# Patient Record
Sex: FEMALE | Race: OTHER | NOT HISPANIC OR LATINO | URBAN - METROPOLITAN AREA
[De-identification: names, ages, dates, MRNs, and addresses within clinical notes are randomized per-mention and may not be internally consistent; named-entity substitution may affect disease eponyms.]

---

## 2023-04-10 ENCOUNTER — APPOINTMENT (RX ONLY)
Dept: URBAN - METROPOLITAN AREA CLINIC 141 | Facility: CLINIC | Age: 68
Setting detail: DERMATOLOGY
End: 2023-04-10

## 2023-04-10 DIAGNOSIS — L57.8 OTHER SKIN CHANGES DUE TO CHRONIC EXPOSURE TO NONIONIZING RADIATION: ICD-10-CM

## 2023-04-10 DIAGNOSIS — D18.0 HEMANGIOMA: ICD-10-CM

## 2023-04-10 DIAGNOSIS — D49.2 NEOPLASM OF UNSPECIFIED BEHAVIOR OF BONE, SOFT TISSUE, AND SKIN: ICD-10-CM

## 2023-04-10 DIAGNOSIS — L82.1 OTHER SEBORRHEIC KERATOSIS: ICD-10-CM

## 2023-04-10 DIAGNOSIS — L81.4 OTHER MELANIN HYPERPIGMENTATION: ICD-10-CM | Status: STABLE

## 2023-04-10 DIAGNOSIS — L82.0 INFLAMED SEBORRHEIC KERATOSIS: ICD-10-CM

## 2023-04-10 DIAGNOSIS — L20.89 OTHER ATOPIC DERMATITIS: ICD-10-CM

## 2023-04-10 DIAGNOSIS — L81.4 OTHER MELANIN HYPERPIGMENTATION: ICD-10-CM

## 2023-04-10 PROBLEM — L20.84 INTRINSIC (ALLERGIC) ECZEMA: Status: ACTIVE | Noted: 2023-04-10

## 2023-04-10 PROBLEM — D18.01 HEMANGIOMA OF SKIN AND SUBCUTANEOUS TISSUE: Status: ACTIVE | Noted: 2023-04-10

## 2023-04-10 PROCEDURE — 17110 DESTRUCTION B9 LES UP TO 14: CPT

## 2023-04-10 PROCEDURE — 11102 TANGNTL BX SKIN SINGLE LES: CPT | Mod: 59

## 2023-04-10 PROCEDURE — ? ELECTRODESICCATION

## 2023-04-10 PROCEDURE — ? BIOPSY BY SHAVE METHOD

## 2023-04-10 PROCEDURE — ? COUNSELING

## 2023-04-10 PROCEDURE — ? PRESCRIPTION

## 2023-04-10 PROCEDURE — 99203 OFFICE O/P NEW LOW 30 MIN: CPT | Mod: 25

## 2023-04-10 PROCEDURE — ? LIQUID NITROGEN

## 2023-04-10 RX ORDER — BETAMETHASONE DIPROPIONATE 0.5 MG/ML
LOTION TOPICAL
Qty: 60 | Refills: 0 | Status: ERX | COMMUNITY
Start: 2023-04-10

## 2023-04-10 RX ORDER — HYDROQUINONE 40 MG/G
CREAM TOPICAL
Qty: 28.4 | Refills: 3 | Status: ERX | COMMUNITY
Start: 2023-04-10

## 2023-04-10 RX ADMIN — BETAMETHASONE DIPROPIONATE: 0.5 LOTION TOPICAL at 00:00

## 2023-04-10 RX ADMIN — HYDROQUINONE: 40 CREAM TOPICAL at 00:00

## 2023-04-10 ASSESSMENT — LOCATION SIMPLE DESCRIPTION DERM
LOCATION SIMPLE: RIGHT FOREHEAD
LOCATION SIMPLE: HAIR
LOCATION SIMPLE: POSTERIOR NECK
LOCATION SIMPLE: LEFT CHEEK
LOCATION SIMPLE: RIGHT FOREHEAD
LOCATION SIMPLE: LEFT FOREHEAD
LOCATION SIMPLE: ABDOMEN
LOCATION SIMPLE: LEFT UPPER BACK
LOCATION SIMPLE: RIGHT CHEEK
LOCATION SIMPLE: RIGHT ANTERIOR NECK
LOCATION SIMPLE: RIGHT UPPER BACK
LOCATION SIMPLE: ABDOMEN
LOCATION SIMPLE: LEFT UPPER BACK

## 2023-04-10 ASSESSMENT — LOCATION ZONE DERM
LOCATION ZONE: SCALP
LOCATION ZONE: TRUNK
LOCATION ZONE: TRUNK
LOCATION ZONE: NECK
LOCATION ZONE: FACE
LOCATION ZONE: FACE

## 2023-04-10 ASSESSMENT — LOCATION DETAILED DESCRIPTION DERM
LOCATION DETAILED: EPIGASTRIC SKIN
LOCATION DETAILED: LEFT RIB CAGE
LOCATION DETAILED: RIGHT INFERIOR LATERAL FOREHEAD
LOCATION DETAILED: RIGHT SUPERIOR LATERAL NECK
LOCATION DETAILED: RIGHT INFERIOR CENTRAL MALAR CHEEK
LOCATION DETAILED: RIGHT SUPERIOR UPPER BACK
LOCATION DETAILED: PERIUMBILICAL SKIN
LOCATION DETAILED: LEFT LATERAL ABDOMEN
LOCATION DETAILED: LEFT LATERAL FOREHEAD
LOCATION DETAILED: RIGHT LATERAL ABDOMEN
LOCATION DETAILED: UMBILICUS
LOCATION DETAILED: LEFT CENTRAL MALAR CHEEK
LOCATION DETAILED: RIGHT LATERAL FOREHEAD
LOCATION DETAILED: HAIR
LOCATION DETAILED: LEFT INFERIOR MEDIAL UPPER BACK
LOCATION DETAILED: RIGHT RIB CAGE
LOCATION DETAILED: LEFT SUPERIOR UPPER BACK
LOCATION DETAILED: RIGHT POSTERIOR NECK

## 2023-04-10 NOTE — PROCEDURE: BIOPSY BY SHAVE METHOD
Detail Level: Detailed
Depth Of Biopsy: dermis
Was A Bandage Applied: Yes
Size Of Lesion In Cm: 0.1
X Size Of Lesion In Cm: 0
Biopsy Type: H and E
Biopsy Method: Dermablade
Anesthesia Type: 1% lidocaine with epinephrine
Anesthesia Volume In Cc (Will Not Render If 0): 0.5
Hemostasis: Drysol
Wound Care: Petrolatum
Dressing: bandage
Destruction After The Procedure: No
Type Of Destruction Used: Curettage
Curettage Text: The wound bed was treated with curettage after the biopsy was performed.
Cryotherapy Text: The wound bed was treated with cryotherapy after the biopsy was performed.
Electrodesiccation Text: The wound bed was treated with electrodesiccation after the biopsy was performed.
Electrodesiccation And Curettage Text: The wound bed was treated with electrodesiccation and curettage after the biopsy was performed.
Silver Nitrate Text: The wound bed was treated with silver nitrate after the biopsy was performed.
Lab: -[id number]
Consent: Written consent was obtained and risks were reviewed including but not limited to scarring, infection, bleeding, scabbing, incomplete removal, nerve damage and allergy to anesthesia.
Post-Care Instructions: I reviewed with the patient in detail post-care instructions. Patient is to keep the biopsy site dry overnight, and then apply bacitracin twice daily until healed. Patient may apply hydrogen peroxide soaks to remove any crusting.
Notification Instructions: Patient will be notified of biopsy results. However, patient instructed to call the office if not contacted within 2 weeks.
Billing Type: [employer]
Information: Selecting Yes will display possible errors in your note based on the variables you have selected. This validation is only offered as a suggestion for you. PLEASE NOTE THAT THE VALIDATION TEXT WILL BE REMOVED WHEN YOU FINALIZE YOUR NOTE. IF YOU WANT TO FAX A PRELIMINARY NOTE YOU WILL NEED TO TOGGLE THIS TO 'NO' IF YOU DO NOT WANT IT IN YOUR FAXED NOTE.

## 2023-04-10 NOTE — PROCEDURE: ELECTRODESICCATION
Medical Necessity Clause: This procedure was medically necessary because the lesions that were treated were:
Consent: The patient's consent was obtained including but not limited to risks of crusting, scabbing, blistering, scarring, darker or lighter pigmentary change, recurrence, incomplete removal and infection.
Include Z78.9 (Other Specified Conditions Influencing Health Status) As An Associated Diagnosis?: No
Post-Care Instructions: I reviewed with the patient in detail post-care instructions. Patient is to wear sunprotection, and avoid picking at any of the treated lesions.  Pt may apply Vaseline to crusted or scabbing areas
Medical Necessity Information: It is in your best interest to select a reason for this procedure from the list below. All of these items fulfill various CMS LCD requirements except the new and changing color options.
Detail Level: Simple
Anesthesia Type: 1% lidocaine with epinephrine

## 2023-04-10 NOTE — PROCEDURE: LIQUID NITROGEN
Consent: The patient's consent was obtained including but not limited to risks of crusting, scabbing, blistering, scarring, darker or lighter pigmentary change, recurrence, incomplete removal and infection.
Render Note In Bullet Format When Appropriate: No
Include Z78.9 (Other Specified Conditions Influencing Health Status) As An Associated Diagnosis?: Yes
Detail Level: Detailed
Medical Necessity Clause: This procedure was medically necessary because the lesions that were treated were:
Spray Paint Text: The liquid nitrogen was applied to the skin utilizing a spray paint frosting technique.
Post-Care Instructions: I reviewed with the patient in detail post-care instructions. Patient is to wear sunprotection, and avoid picking at any of the treated lesions. Pt may apply Vaseline to crusted or scabbing areas.
Medical Necessity Information: It is in your best interest to select a reason for this procedure from the list below. All of these items fulfill various CMS LCD requirements except the new and changing color options.

## 2023-05-08 ENCOUNTER — APPOINTMENT (RX ONLY)
Dept: URBAN - METROPOLITAN AREA CLINIC 141 | Facility: CLINIC | Age: 68
Setting detail: DERMATOLOGY
End: 2023-05-08

## 2023-05-08 DIAGNOSIS — I87.2 VENOUS INSUFFICIENCY (CHRONIC) (PERIPHERAL): ICD-10-CM | Status: INADEQUATELY CONTROLLED

## 2023-05-08 DIAGNOSIS — I87.2 VENOUS INSUFFICIENCY (CHRONIC) (PERIPHERAL): ICD-10-CM

## 2023-05-08 PROCEDURE — ? VEIN TREATMENT PLAN

## 2023-05-08 PROCEDURE — ? MEDICAL CONSULTATION: VENOUS DISEASE

## 2023-05-08 PROCEDURE — ? LOWER EXTREMITY DOPPLER US

## 2023-05-08 PROCEDURE — 93970 EXTREMITY STUDY: CPT

## 2023-05-08 PROCEDURE — 99204 OFFICE O/P NEW MOD 45 MIN: CPT

## 2023-05-08 NOTE — PROCEDURE: MEDICAL CONSULTATION: VENOUS DISEASE
Left Leg Ulcer Diameter: 0- None
Left Leg Circumference: medium
Right Leg Pain: 2- Daily, moderate activity limitation, occasional analgesics
Left Leg Compression Therapy: 1- Intermittent use of stockings
Right Dorsalis Pedis Pulse: 2 (Easily palpable)
Left Leg: Peripheral Vascular Disease?: No
Include Vcss In The Note?: Yes
Length Of Time Symptoms Present (Include Units): 10 years
Left Leg Venous Hyperpigmentation: 2- Diffuse over most of gaiter distribution (lower 1/3) or recent pigmentation (purple)
Detail Level: Detailed
Left Leg Varicose Veins: 2- Multiple: GS varicose veins confined to calf or thigh

## 2023-05-08 NOTE — PROCEDURE: VEIN TREATMENT PLAN
Closing Statement (Will Not Render If Left Blank): Discussed treatment options. Risks and benefits of each procedure discussed. These are including but not limited to: deep vein thrombosis, pulmonary embolism, paresthesia, phlebitis, infection, bleeding, and visible scarring. After risks and benefits have been reviewed with the patient and all questions answered the patient has decided to move forward with treatment. Patient education booklet given and pre/post procedure instructions reviewed with the patient.
Angie Sessions - Right: 1
Ugs Sessions - Right: 2
Detail Level: Detailed
Intro Statement (Will Not Render If Left Blank): The patient has signs and symptoms of chronic venous hypertension affecting daily function with US of the lower extremities demonstrating venous insufficiency. The patient has failed conservative treatment including avoiding prolonged standing, leg elevation, analgesics, exercise, weight management and graduated compression stockings (20-30mmHg or higher).
Symptom Statement (Will Not Render If Left Blank): US demonstrates severe reflux in the:\\n\\nBilateral GSVs and bilateral tributaries. \\n\\n\\nThe plan is to proceed with:\\n\\nVenaseal of the GSV \\nVarithena of the D GSV   \\nbilateral UGS. \\n\\nRisks, benefits, and alternatives were discussed.

## 2023-05-08 NOTE — PROCEDURE: LOWER EXTREMITY DOPPLER US
Size In Mm: 3.1
Size In Mm: 4.9
Size In Mm: 4.3
Reflux Options: Use Free Text
Recommend Cosmetic Sclerotherapy: No
Reflux (In Seconds - Leave Blank If Not Applicable): 2.0
Right Tributary Reflux (In Seconds - Leave Blank If Not Applicable): 0.7
Reflux (In Seconds - Leave Blank If Not Applicable): 1.8
Left Lower Extremity Comments: TRIBS MEDIAL KNEE.
See Attached Documentation Text: Please refer to the attached ultrasound documentation for complete details of the procedure and the venous findings.
Reflux (In Seconds - Leave Blank If Not Applicable): 2.6
Reflux (In Seconds - Leave Blank If Not Applicable): 0.8
Reflux (In Seconds - Leave Blank If Not Applicable): 1.9
Continue Post-Procedural Therapy: Yes
Reflux (In Seconds - Leave Blank If Not Applicable): 1.7
Right Tributary Size In Mm: 4.0
Size In Mm: 5.1
Right Lower Extremity Comments: TRIBS MEDIAL  AND ANTERIOR/LATERAL CALF.
Detail Level: Simple
Reflux (In Seconds - Leave Blank If Not Applicable): 1.3
Treatment Number (Optional): Venous: BL GSV, BL D GSV
Left Tributary Reflux (In Seconds - Leave Blank If Not Applicable): 2.2
Size In Mm: 4.8
Size In Mm: 3.5
Size In Mm: 4.6
Size In Mm: 2.7

## 2023-06-12 ENCOUNTER — APPOINTMENT (RX ONLY)
Dept: URBAN - METROPOLITAN AREA CLINIC 141 | Facility: CLINIC | Age: 68
Setting detail: DERMATOLOGY
End: 2023-06-12

## 2023-06-12 DIAGNOSIS — I87.2 VENOUS INSUFFICIENCY (CHRONIC) (PERIPHERAL): ICD-10-CM

## 2023-06-12 PROCEDURE — ? ENDOVENOUS ABLATION WITH VENASEAL

## 2023-06-12 PROCEDURE — 36482 ENDOVEN THER CHEM ADHES 1ST: CPT | Mod: LT

## 2023-06-12 NOTE — PROCEDURE: ENDOVENOUS ABLATION WITH VENASEAL
Preamble Single Vein: Patient seen today for ultrasound-guided cyanoacrylate embolization of the left GSV by administration of medical adhesive VenaSeal. The procedure was explained in depth to the patient and informed consent was signed. Alternative treatment options discussed. Questions answered. The patient voiced understanding of the procedure and potential complications including but not limited to allergic recaption, infection, bleeding, DVT, pulmonary embolism, skin burns/discoloration, nerve injury, arterial injury, ulcer and paresthesia. \\n\\nThe patient was placed on the table in supine position. Using sterile technique and  1 cc of 1% lidocaine was injected in the skin and subcutaneous tissues overlying the venous access site. The left GSV was accessed with micro puncture set under ultrasound guidance at the level of the mid thigh. A micro puncture sheath was exchanged over the wire for a long vascular sheath with its tip positioned at the SFJ under ultrasound guidance. A catheter was then advanced through the sheath with its tip positioned in the GSV, 5 cm from the SFJ, which was achieved under direct ultrasound guidance. Cyanoacrylate embolization was performed. \\n\\nTotal of 43 cm of the vein were treated with 1.5 mL cyanoacrylate administered. \\n\\nThe sheath and catheter were then removed and hemostasis was achieved. Ultrasound examination immediately after the procedure demonstrated no evidence of DVT in the left lower extremity. \\n\\nSterile dressings were applied. The patient tolerated the procedure well without immediate complications and left the operating room ambulating in stable condition. \\n\\nPost-operative instructions given and reviewed with patient verbally and in writing. The patient was given written instructions and Dr Celia Juarez cell phone number to contact with any issues or concerns. Patient verbalized understanding of all instructions. Questions encouraged and answered.
Access Site Anesthesia Type: 1% lidocaine with epinephrine
Evla Access: medial calf
Vein #1: Left Anterior Accessory Great Saphenous Vein
Number Of Kits Used (Required For Inventory): 1
Medical Necessity Information: LCD Guidelines vary from payer to payer. Please check with your payer's policy to determine medical necessity.
Evla Access: medial knee
Show Inventory Tab: Hide
Amount Of Adhesive Delivered: 1.5
Add A New Paragraph For Post-Care?: No
Consent was obtained with risks, benefits, and alternatives discussed for the VenaSeal procedure. Photographs were taken.
Detail Level: Detailed

## 2023-06-19 ENCOUNTER — APPOINTMENT (RX ONLY)
Dept: URBAN - METROPOLITAN AREA CLINIC 141 | Facility: CLINIC | Age: 68
Setting detail: DERMATOLOGY
End: 2023-06-19

## 2023-06-19 DIAGNOSIS — I87.2 VENOUS INSUFFICIENCY (CHRONIC) (PERIPHERAL): ICD-10-CM

## 2023-06-19 PROCEDURE — ? ENDOVENOUS ABLATION WITH VENASEAL

## 2023-06-19 PROCEDURE — 36482 ENDOVEN THER CHEM ADHES 1ST: CPT | Mod: RT

## 2023-06-19 NOTE — PROCEDURE: ENDOVENOUS ABLATION WITH VENASEAL
Access Site Anesthesia Type: 1% lidocaine with epinephrine
Evla Access: medial calf
Detail Level: Detailed
Amount Of Adhesive Delivered: 0.6
Show Inventory Tab: Hide
Amount Of Adhesive Delivered: 1.4
Vein #1: Right Great Saphenous Vein
Evla Access: medial thigh
Medical Necessity Clause: Lot # [id number]
Medical Necessity Information: LCD Guidelines vary from payer to payer. Please check with your payer's policy to determine medical necessity.
Evla Access: posterior mid calf
Number Of Kits Used (Required For Inventory): 1
Consent was obtained with risks, benefits, and alternatives discussed for the VenaSeal procedure. Photographs were taken.
Add A New Paragraph For Post-Care?: No
Preamble Single Vein: Patient seen today for ultrasound-guided cyanoacrylate embolization of the right GSV by administration of medical adhesive VenaSeal. The procedure was explained in depth to the patient and informed consent was signed. Alternative treatment options discussed. Questions answered. The patient voiced understanding of the procedure and potential complications including but not limited to allergic recaption, infection, bleeding, DVT, pulmonary embolism, skin burns/discoloration, nerve injury, arterial injury, ulcer and paresthesia. \\n\\nThe patient was placed on the table in prone position. Using sterile technique and  .3 cc of 1% lidocaine was injected in the skin and subcutaneous tissues overlying the venous access site. The right GSV was accessed with micro puncture set under ultrasound guidance at the level of the medial calf. A micro puncture sheath was exchanged over the wire for a long vascular sheath with its tip positioned at the SFJ under ultrasound guidance. A catheter was then advanced through the sheath with its tip positioned in the GSV 5 cm from the SFJ, which was achieved under direct ultrasound guidance. Cyanoacrylate embolization was performed. \\n\\nTotal of 40 cm of the vein were treated with -1.4 mL cyanoacrylate administered. \\n\\nThe sheath and catheter were then removed and hemostasis was achieved. Ultrasound examination immediately after the procedure demonstrated no evidence of DVT in the left lower extremity. \\n\\nSterile dressings were applied. The patient tolerated the procedure well without immediate complications and left the operating room ambulating in stable condition. \\n\\nPost-operative instructions given and reviewed with patient verbally and in writing. The patient was given written instructions and Dr Shelton Cluster cell phone number to contact with any issues or concerns. Patient verbalized understanding of all instructions. Questions encouraged and answered.

## 2023-06-26 ENCOUNTER — APPOINTMENT (RX ONLY)
Dept: URBAN - METROPOLITAN AREA CLINIC 141 | Facility: CLINIC | Age: 68
Setting detail: DERMATOLOGY
End: 2023-06-26

## 2023-06-26 DIAGNOSIS — I87.2 VENOUS INSUFFICIENCY (CHRONIC) (PERIPHERAL): ICD-10-CM

## 2023-06-26 PROCEDURE — ? VARITHENA SCLEROTHERAPY

## 2023-06-26 PROCEDURE — 36465 NJX NONCMPND SCLRSNT 1 VEIN: CPT | Mod: LT

## 2023-06-26 ASSESSMENT — LOCATION SIMPLE DESCRIPTION DERM: LOCATION SIMPLE: LEFT PRETIBIAL REGION

## 2023-06-26 ASSESSMENT — LOCATION DETAILED DESCRIPTION DERM: LOCATION DETAILED: LEFT DISTAL PRETIBIAL REGION

## 2023-06-26 ASSESSMENT — LOCATION ZONE DERM: LOCATION ZONE: LEG

## 2023-06-26 NOTE — PROCEDURE: VARITHENA SCLEROTHERAPY
Detail Level: Detailed
Sclerosant Volume (Cc): 3
Consent was obtained with risks, benefits, and alternatives discussed for the above procedures.
Expiration Date A (Month Year): [id number]
Disposition: Compression stockings and short stretch elastic bandages were placed postoperatively.
Volume Of Varithena Foam Removed From Canister (Cc): 0
Sclerosant (A) %: 1
Sclerosant (A): Varithena Foam
Render Post Care In Note: No
Show Inventory Tab: Hide
Lot # A: [id number]

## 2023-07-10 ENCOUNTER — APPOINTMENT (RX ONLY)
Dept: URBAN - METROPOLITAN AREA CLINIC 141 | Facility: CLINIC | Age: 68
Setting detail: DERMATOLOGY
End: 2023-07-10

## 2023-07-10 DIAGNOSIS — I87.2 VENOUS INSUFFICIENCY (CHRONIC) (PERIPHERAL): ICD-10-CM

## 2023-07-10 PROCEDURE — ? VARITHENA SCLEROTHERAPY

## 2023-07-10 PROCEDURE — 36465 NJX NONCMPND SCLRSNT 1 VEIN: CPT | Mod: RT

## 2023-07-10 ASSESSMENT — LOCATION SIMPLE DESCRIPTION DERM: LOCATION SIMPLE: RIGHT PRETIBIAL REGION

## 2023-07-10 ASSESSMENT — LOCATION DETAILED DESCRIPTION DERM: LOCATION DETAILED: RIGHT DISTAL PRETIBIAL REGION

## 2023-07-10 ASSESSMENT — LOCATION ZONE DERM: LOCATION ZONE: LEG

## 2023-07-10 NOTE — PROCEDURE: VARITHENA SCLEROTHERAPY
Sclerosant Volume (Cc): 3
Consent was obtained with risks, benefits, and alternatives discussed for the above procedures.
Number Of Injections (Will Not Render If 0): 1
Sclerosant (A): Varithena Foam
Show Inventory Tab: Hide
Lot # A: [id number]
Volume Of Varithena Foam Removed From Canister (Cc): 0
Detail Level: Detailed
Render Post Care In Note: No
Disposition: Compression stockings and short stretch elastic bandages were placed postoperatively.
Expiration Date A (Month Year): MARCH 2024

## 2023-08-28 ENCOUNTER — APPOINTMENT (RX ONLY)
Dept: URBAN - METROPOLITAN AREA CLINIC 141 | Facility: CLINIC | Age: 68
Setting detail: DERMATOLOGY
End: 2023-08-28

## 2023-08-28 DIAGNOSIS — I87.2 VENOUS INSUFFICIENCY (CHRONIC) (PERIPHERAL): ICD-10-CM

## 2023-08-28 PROCEDURE — 76942 ECHO GUIDE FOR BIOPSY: CPT

## 2023-08-28 PROCEDURE — 36471 NJX SCLRSNT MLT INCMPTNT VN: CPT | Mod: LT

## 2023-08-28 PROCEDURE — ? SCLEROTHERAPY

## 2023-08-28 ASSESSMENT — LOCATION SIMPLE DESCRIPTION DERM
LOCATION SIMPLE: LEFT CALF
LOCATION SIMPLE: LEFT PRETIBIAL REGION
LOCATION SIMPLE: LEFT THIGH

## 2023-08-28 ASSESSMENT — LOCATION ZONE DERM: LOCATION ZONE: LEG

## 2023-08-28 ASSESSMENT — LOCATION DETAILED DESCRIPTION DERM
LOCATION DETAILED: LEFT DISTAL PRETIBIAL REGION
LOCATION DETAILED: LEFT PROXIMAL PRETIBIAL REGION
LOCATION DETAILED: LEFT PROXIMAL LATERAL CALF
LOCATION DETAILED: LEFT DISTAL CALF
LOCATION DETAILED: LEFT ANTERIOR PROXIMAL THIGH
LOCATION DETAILED: LEFT MEDIAL PROXIMAL PRETIBIAL REGION
LOCATION DETAILED: LEFT ANTERIOR DISTAL THIGH

## 2023-08-28 NOTE — PROCEDURE: SCLEROTHERAPY
Render Post Care In Note: No
Sclerosant Volume (Cc): 1
Number Of Injections (Will Not Render If 0): 0
Post-Care Instructions: Compression for 3 weeks is critical to optimize your recovery and results. Compliance with compression helps to prevent blood clots and minimizes pain, swelling, bruising, skin discoloration (staining) and the recurrence of vessels. Materials to gather for your wound care (all available over the counter): Compression stockings x 2 pairs, 4 x 4 gauze, Comprilan wrap: 8 cm and 10 cm width wrap, Medical adhesive tape. Compression and Wound care;  Leg compression for 3 weeks is severino to your success and safety. Compression at night is only needed the first day. After that, compression is needed only during waking hours. However, if your leg feels better with compression at night, then you may continue compression at night as tolerated. After the sclerotherapy procedure, 2 layers compression will be placed. 1. On the skin, folded or flat gauze will cover the treated areas. 2. A compression wrap (Comprilan) will be wrapped around your leg over the gauze. Once the compression wrap is in place, a compression stocking will be worn. This two layer  compression (wrap plus stocking) should be worn for the first 24 hours if tolerated. 3. After 24 hours, remove all compressions and dressings and just wear the compression stockings only during waking hours. You will need to wear compression stockings for three weeks after your procedure, unless your physician instructs you otherwise. Activity: It is important NOT to be sitting or lying down for several hours after surgery. You may begin walking immediately after surgery. This is good for you, but take it easy. For 2 days after treatment: Avoid aerobic exercise, weight training, and all other types of exertion that increase your breathing and pulse rates. Do not get a tan for one month after sclerotherapy. Tanning increases your risk of skin discoloration. Bathing:       After 24 hours, you may shower or bathe in tepid water, but keep the compression stocking on. Avoid immersion in hot tubs. For pain or discomfort: You may take acetaminophen (TylenolTM, Extra-Strength TylenolTM or DatrilTM) as directed. Do not use aspirin, products containing aspirin, or ibuprofen (for example AdvilTM or MotrinTM) for five days after your surgery, unless approved by your physician. Dietary restrictions: Do not drink alcoholic beverages for two days after your sclerotherapy procedure. Possible side effects following sclerotherapy:  After sclerotherapy, mild swelling is expected. The injection sites may also become bruised or gray. You may also experience one or more of the following side effects, which almost always go away within one to four months:       Darkening of the injected veins. Brownish staining of the skin. Small clotted vessels under the surface of the skin that you can feel. Bruising of the injection sites:       What to do about bruising - This will resolve within 2-3 weeks. If you wish the bruising to disappear sooner, then applying Arnicare cream (over the counter, health food stores) will help. What to do if you feel small clotted vessels under the surface of the skin:       Call us for a follow up appointment. These small clots can be drained through a small nick. Draining these small clots will help you heal faster and with less discoloration. Contact your physician if you experience any of the following:       Treated areas become increasingly sore, tender, red or warm. Acetaminophen does not relieve your discomfort. Injection sites turn black or the skin around the site breaks down. Ulceration of the injection sites. You develop blisters from the tape. You develop significant swelling or pain in the leg. Darkening of large areas of the skin or foot.
Sclerosant Volume (Cc): 10
Sclerosant (A) %: 0.50
Detail Level: Detailed
Consent was obtained with risks, benefits, and alternatives discussed for the above procedures. Photographs were taken.
Number Of Injections (Will Not Render If 0): 9
Disposition: Compression stockings and short stretch elastic bandages were placed postoperatively.
Ultrasound Or Visual Sclerotherapy: Ultrasound

## 2023-10-16 ENCOUNTER — APPOINTMENT (RX ONLY)
Dept: URBAN - METROPOLITAN AREA CLINIC 141 | Facility: CLINIC | Age: 68
Setting detail: DERMATOLOGY
End: 2023-10-16

## 2023-10-16 DIAGNOSIS — I87.2 VENOUS INSUFFICIENCY (CHRONIC) (PERIPHERAL): ICD-10-CM

## 2023-10-16 PROCEDURE — 93970 EXTREMITY STUDY: CPT

## 2023-10-16 PROCEDURE — ? LOWER EXTREMITY DOPPLER US

## 2023-10-16 NOTE — PROCEDURE: LOWER EXTREMITY DOPPLER US
Size In Mm: 1.8
Treatment Number (Optional): Long Term F/U: R D GSV Reflux
Include Thrombophlebitis Instructions: Yes
Use - 'see Attached Documentation' Verbiage?: No
Size In Mm: 0
Size Options: Use Free Text
Right Intraluminal Thrombus- Yes: The right deep veins were imaged from the level of the common femoral vein to the posterior tibial veins. There was evidence of intraluminal thrombus as noted above.
See Attached Documentation Text: Please refer to the attached ultrasound documentation for complete details of the procedure and the venous findings.
Detail Level: Simple
Size In Mm: 2.5
Right Lower Extremity Comments: D GSV reflux to mid calf only. \\nTRIBS ANTERIOR/LATERAL CALF.
Size In Mm: 2.4
Right Tributary Size In Mm: 3.3
Size In Mm: 3.1
Right Tributary Reflux (In Seconds - Leave Blank If Not Applicable): 1.3
Size In Mm: 4.9
Size In Mm: 6.8
Reflux (In Seconds - Leave Blank If Not Applicable): 1.2

## 2023-10-23 ENCOUNTER — APPOINTMENT (RX ONLY)
Dept: URBAN - METROPOLITAN AREA CLINIC 141 | Facility: CLINIC | Age: 68
Setting detail: DERMATOLOGY
End: 2023-10-23

## 2023-10-23 DIAGNOSIS — I87.2 VENOUS INSUFFICIENCY (CHRONIC) (PERIPHERAL): ICD-10-CM

## 2023-10-23 PROCEDURE — 36471 NJX SCLRSNT MLT INCMPTNT VN: CPT | Mod: LT

## 2023-10-23 PROCEDURE — 76942 ECHO GUIDE FOR BIOPSY: CPT

## 2023-10-23 PROCEDURE — ? SCLEROTHERAPY

## 2023-10-23 PROCEDURE — ? VEIN TREATMENT PLAN

## 2023-10-23 ASSESSMENT — LOCATION SIMPLE DESCRIPTION DERM
LOCATION SIMPLE: LEFT KNEE
LOCATION SIMPLE: LEFT POPLITEAL SKIN
LOCATION SIMPLE: LEFT PRETIBIAL REGION
LOCATION SIMPLE: LEFT THIGH
LOCATION SIMPLE: LEFT CALF

## 2023-10-23 ASSESSMENT — LOCATION DETAILED DESCRIPTION DERM
LOCATION DETAILED: LEFT POPLITEAL SKIN
LOCATION DETAILED: LEFT KNEE
LOCATION DETAILED: LEFT PROXIMAL LATERAL CALF
LOCATION DETAILED: LEFT PROXIMAL CALF
LOCATION DETAILED: LEFT ANTERIOR PROXIMAL THIGH
LOCATION DETAILED: LEFT DISTAL MEDIAL CALF
LOCATION DETAILED: LEFT DISTAL LATERAL CALF
LOCATION DETAILED: LEFT DISTAL PRETIBIAL REGION
LOCATION DETAILED: LEFT LATERAL KNEE
LOCATION DETAILED: LEFT ANTERIOR DISTAL THIGH
LOCATION DETAILED: LEFT PROXIMAL PRETIBIAL REGION

## 2023-10-23 ASSESSMENT — LOCATION ZONE DERM: LOCATION ZONE: LEG

## 2023-10-23 NOTE — PROCEDURE: SCLEROTHERAPY
Consent was obtained with risks, benefits, and alternatives discussed for the above procedures. Photographs were taken.
Disposition: Compression stockings and short stretch elastic bandages were placed postoperatively.
Number Of Injections (Will Not Render If 0): 10
Post-Care Instructions: Compression for 3 weeks is critical to optimize your recovery and results. Compliance with compression helps to prevent blood clots and minimizes pain, swelling, bruising, skin discoloration (staining) and the recurrence of vessels. Materials to gather for your wound care (all available over the counter): Compression stockings x 2 pairs, 4 x 4 gauze, Comprilan wrap: 8 cm and 10 cm width wrap, Medical adhesive tape. Compression and Wound care;  Leg compression for 3 weeks is severino to your success and safety. Compression at night is only needed the first day. After that, compression is needed only during waking hours. However, if your leg feels better with compression at night, then you may continue compression at night as tolerated. After the sclerotherapy procedure, 2 layers compression will be placed. 1. On the skin, folded or flat gauze will cover the treated areas. 2. A compression wrap (Comprilan) will be wrapped around your leg over the gauze. Once the compression wrap is in place, a compression stocking will be worn. This two layer  compression (wrap plus stocking) should be worn for the first 24 hours if tolerated. 3. After 24 hours, remove all compressions and dressings and just wear the compression stockings only during waking hours. You will need to wear compression stockings for three weeks after your procedure, unless your physician instructs you otherwise. Activity: It is important NOT to be sitting or lying down for several hours after surgery. You may begin walking immediately after surgery. This is good for you, but take it easy. For 2 days after treatment: Avoid aerobic exercise, weight training, and all other types of exertion that increase your breathing and pulse rates. Do not get a tan for one month after sclerotherapy. Tanning increases your risk of skin discoloration. Bathing:       After 24 hours, you may shower or bathe in tepid water, but keep the compression stocking on. Avoid immersion in hot tubs. For pain or discomfort: You may take acetaminophen (TylenolTM, Extra-Strength TylenolTM or DatrilTM) as directed. Do not use aspirin, products containing aspirin, or ibuprofen (for example AdvilTM or MotrinTM) for five days after your surgery, unless approved by your physician. Dietary restrictions: Do not drink alcoholic beverages for two days after your sclerotherapy procedure. Possible side effects following sclerotherapy:  After sclerotherapy, mild swelling is expected. The injection sites may also become bruised or gray. You may also experience one or more of the following side effects, which almost always go away within one to four months:       Darkening of the injected veins. Brownish staining of the skin. Small clotted vessels under the surface of the skin that you can feel. Bruising of the injection sites:       What to do about bruising - This will resolve within 2-3 weeks. If you wish the bruising to disappear sooner, then applying Arnicare cream (over the counter, health food stores) will help. What to do if you feel small clotted vessels under the surface of the skin:       Call us for a follow up appointment. These small clots can be drained through a small nick. Draining these small clots will help you heal faster and with less discoloration. Contact your physician if you experience any of the following:       Treated areas become increasingly sore, tender, red or warm. Acetaminophen does not relieve your discomfort. Injection sites turn black or the skin around the site breaks down. Ulceration of the injection sites. You develop blisters from the tape. You develop significant swelling or pain in the leg. Darkening of large areas of the skin or foot.
Render Post Care In Note: No
Detail Level: Detailed
Number Of Injections (Will Not Render If 0): 0
Sclerosant (A) %: 1
Ultrasound Or Visual Sclerotherapy: Ultrasound

## 2023-10-23 NOTE — PROCEDURE: VEIN TREATMENT PLAN
Symptom Statement (Will Not Render If Left Blank): US demonstrates severe reflux in the right mid GSV\\n\\nThe plan is to proceed with \\n\\n\\n\\nVarithena right mid GSV
Detail Level: Simple
Intro Statement (Will Not Render If Left Blank): The patient has signs and symptoms of chronic venous hypertension affecting daily function with US of the lower extremities demonstrating venous insufficiency. She is s/p previous ablations. She has had previous ablations. The patient has failed conservative treatment including avoiding prolonged standing, leg elevation, analgesics, exercise, weight management and graduated compression stockings (20-30mmHg or higher).
Closing Statement (Will Not Render If Left Blank): Discussed treatment options. Risks and benefits of each procedure discussed. These are including but not limited to: deep vein thrombosis, pulmonary embolism, paresthesia, phlebitis, infection, bleeding, and visible scarring. After risks and benefits have been reviewed with the patient and all questions answered the patient has decided to move forward with treatment. Patient education booklet given and pre/post procedure instructions reviewed with the patient.

## 2023-10-30 ENCOUNTER — APPOINTMENT (RX ONLY)
Dept: URBAN - METROPOLITAN AREA CLINIC 141 | Facility: CLINIC | Age: 68
Setting detail: DERMATOLOGY
End: 2023-10-30

## 2023-10-30 DIAGNOSIS — I87.2 VENOUS INSUFFICIENCY (CHRONIC) (PERIPHERAL): ICD-10-CM

## 2023-10-30 PROCEDURE — 36465 NJX NONCMPND SCLRSNT 1 VEIN: CPT | Mod: RT

## 2023-10-30 PROCEDURE — ? VARITHENA SCLEROTHERAPY

## 2023-10-30 ASSESSMENT — LOCATION DETAILED DESCRIPTION DERM: LOCATION DETAILED: RIGHT DISTAL PRETIBIAL REGION

## 2023-10-30 ASSESSMENT — LOCATION ZONE DERM: LOCATION ZONE: LEG

## 2023-10-30 ASSESSMENT — LOCATION SIMPLE DESCRIPTION DERM: LOCATION SIMPLE: RIGHT PRETIBIAL REGION

## 2023-10-30 NOTE — PROCEDURE: VARITHENA SCLEROTHERAPY
Number Of Injections (Will Not Render If 0): 1
Consent was obtained with risks, benefits, and alternatives discussed for the above procedures.
Sclerosant Volume (Cc): 3
Disposition: Compression stockings and short stretch elastic bandages were placed postoperatively.
Render Post Care In Note: No
Detail Level: Detailed
Sclerosant (A): Varithena Foam
Show Inventory Tab: Hide
Lot # A: [id number]
Volume Of Varithena Foam Removed From Canister (Cc): 0
Expiration Date A (Month Year): [id number]

## 2024-01-08 ENCOUNTER — APPOINTMENT (RX ONLY)
Dept: URBAN - METROPOLITAN AREA CLINIC 141 | Facility: CLINIC | Age: 69
Setting detail: DERMATOLOGY
End: 2024-01-08

## 2024-01-08 DIAGNOSIS — I87.2 VENOUS INSUFFICIENCY (CHRONIC) (PERIPHERAL): ICD-10-CM

## 2024-01-08 PROCEDURE — ? SCLEROTHERAPY

## 2024-01-08 PROCEDURE — 76942 ECHO GUIDE FOR BIOPSY: CPT

## 2024-01-08 PROCEDURE — 36471 NJX SCLRSNT MLT INCMPTNT VN: CPT | Mod: RT

## 2024-01-08 ASSESSMENT — LOCATION ZONE DERM
LOCATION ZONE: LEG
LOCATION ZONE: LEG

## 2024-01-08 ASSESSMENT — LOCATION DETAILED DESCRIPTION DERM
LOCATION DETAILED: RIGHT ANTERIOR DISTAL THIGH
LOCATION DETAILED: RIGHT ANTERIOR PROXIMAL THIGH
LOCATION DETAILED: RIGHT DISTAL PRETIBIAL REGION
LOCATION DETAILED: RIGHT PROXIMAL PRETIBIAL REGION

## 2024-01-08 ASSESSMENT — LOCATION SIMPLE DESCRIPTION DERM
LOCATION SIMPLE: RIGHT PRETIBIAL REGION
LOCATION SIMPLE: RIGHT THIGH
LOCATION SIMPLE: RIGHT THIGH

## 2024-01-08 NOTE — PROCEDURE: SCLEROTHERAPY

## 2024-03-18 ENCOUNTER — APPOINTMENT (RX ONLY)
Dept: URBAN - METROPOLITAN AREA CLINIC 141 | Facility: CLINIC | Age: 69
Setting detail: DERMATOLOGY
End: 2024-03-18

## 2024-03-18 DIAGNOSIS — I87.2 VENOUS INSUFFICIENCY (CHRONIC) (PERIPHERAL): ICD-10-CM

## 2024-03-18 DIAGNOSIS — I80 PHLEBITIS AND THROMBOPHLEBITIS: ICD-10-CM

## 2024-03-18 PROBLEM — I80.01 PHLEBITIS AND THROMBOPHLEBITIS OF SUPERFICIAL VESSELS OF RIGHT LOWER EXTREMITY: Status: ACTIVE | Noted: 2024-03-18

## 2024-03-18 PROCEDURE — ? INCISION AND DRAINAGE

## 2024-03-18 PROCEDURE — 10140 I&D HMTMA SEROMA/FLUID COLLJ: CPT

## 2024-03-18 PROCEDURE — ? SCLEROTHERAPY

## 2024-03-18 PROCEDURE — 36471 NJX SCLRSNT MLT INCMPTNT VN: CPT | Mod: RT

## 2024-03-18 PROCEDURE — 76942 ECHO GUIDE FOR BIOPSY: CPT | Mod: 59

## 2024-03-18 ASSESSMENT — LOCATION DETAILED DESCRIPTION DERM
LOCATION DETAILED: RIGHT KNEE
LOCATION DETAILED: RIGHT PROXIMAL PRETIBIAL REGION
LOCATION DETAILED: RIGHT ANTERIOR DISTAL THIGH
LOCATION DETAILED: RIGHT LATERAL PROXIMAL PRETIBIAL REGION
LOCATION DETAILED: RIGHT DISTAL PRETIBIAL REGION
LOCATION DETAILED: RIGHT PROXIMAL PRETIBIAL REGION
LOCATION DETAILED: RIGHT DISTAL PRETIBIAL REGION
LOCATION DETAILED: RIGHT ANTERIOR PROXIMAL THIGH
LOCATION DETAILED: RIGHT LATERAL DISTAL PRETIBIAL REGION

## 2024-03-18 ASSESSMENT — LOCATION SIMPLE DESCRIPTION DERM
LOCATION SIMPLE: RIGHT THIGH
LOCATION SIMPLE: RIGHT THIGH
LOCATION SIMPLE: RIGHT KNEE
LOCATION SIMPLE: RIGHT PRETIBIAL REGION
LOCATION SIMPLE: RIGHT PRETIBIAL REGION

## 2024-03-18 ASSESSMENT — LOCATION ZONE DERM
LOCATION ZONE: LEG
LOCATION ZONE: LEG

## 2024-03-18 NOTE — PROCEDURE: INCISION AND DRAINAGE
Detail Level: Detailed
Lesion Type: Hematomas
I&D: multiple
Method: 11 blade
Curette: No
Anesthesia Type: 1% Xylocaine with epinephrine
Anesthesia Volume In Cc: 0.2
Size Of Lesion In Cm (Optional But May Be Required For Some Insurances): 0
Drainage Amount?: minimal
Dressing: dry sterile dressing
Epidermal Sutures: 4-0 Ethilon
Epidermal Closure: simple interrupted
Suture Text: The incision was partially closed with
Consent was obtained and risks were reviewed including but not limited to delayed wound healing, infection, need for multiple I and D's, and pain.
Post-Care Instructions: I reviewed with the patient in detail post-care instructions. Patient should keep wound covered and call the office should any redness, pain, swelling or worsening occur.

## 2024-03-18 NOTE — PROCEDURE: SCLEROTHERAPY

## 2024-06-10 ENCOUNTER — APPOINTMENT (RX ONLY)
Dept: URBAN - METROPOLITAN AREA CLINIC 141 | Facility: CLINIC | Age: 69
Setting detail: DERMATOLOGY
End: 2024-06-10

## 2024-06-10 DIAGNOSIS — I87.2 VENOUS INSUFFICIENCY (CHRONIC) (PERIPHERAL): ICD-10-CM

## 2024-06-10 PROCEDURE — 36471 NJX SCLRSNT MLT INCMPTNT VN: CPT | Mod: RT

## 2024-06-10 PROCEDURE — ? SCLEROTHERAPY

## 2024-06-10 ASSESSMENT — LOCATION DETAILED DESCRIPTION DERM
LOCATION DETAILED: RIGHT ANTERIOR DISTAL THIGH
LOCATION DETAILED: RIGHT LATERAL POSTERIOR ANKLE
LOCATION DETAILED: RIGHT PROXIMAL PRETIBIAL REGION
LOCATION DETAILED: RIGHT PROXIMAL PRETIBIAL REGION
LOCATION DETAILED: RIGHT KNEE
LOCATION DETAILED: RIGHT DISTAL PRETIBIAL REGION
LOCATION DETAILED: RIGHT LATERAL PROXIMAL PRETIBIAL REGION
LOCATION DETAILED: RIGHT DISTAL PRETIBIAL REGION
LOCATION DETAILED: RIGHT DISTAL LATERAL CALF

## 2024-06-10 ASSESSMENT — LOCATION SIMPLE DESCRIPTION DERM
LOCATION SIMPLE: RIGHT PRETIBIAL REGION
LOCATION SIMPLE: RIGHT KNEE
LOCATION SIMPLE: RIGHT ANKLE
LOCATION SIMPLE: RIGHT THIGH
LOCATION SIMPLE: RIGHT CALF
LOCATION SIMPLE: RIGHT PRETIBIAL REGION

## 2024-06-10 ASSESSMENT — LOCATION ZONE DERM
LOCATION ZONE: LEG
LOCATION ZONE: LEG

## 2024-06-10 NOTE — PROCEDURE: SCLEROTHERAPY
Number Of Injections (Will Not Render If 0): 0
Number Of Injections (Will Not Render If 0): 8
Sclerosant Volume (Cc): 10
Detail Level: Detailed
Ultrasound Or Visual Sclerotherapy: Ultrasound ([id number])
Post-Care Instructions: Compression for 3 weeks is critical to optimize your recovery and results. Compliance with compression helps to prevent blood clots and minimizes pain, swelling, bruising, skin discoloration (staining) and the recurrence of vessels.        Materials to gather for your wound care (all available over the counter):        Compression stockings x 2 pairs, 4 x 4 gauze, Comprilan wrap: 8 cm and 10 cm width wrap, Medical adhesive tape.       Compression and Wound care;  Leg compression for 3 weeks is severino to your success and safety. Compression at night is only needed the first day. After that, compression is needed only during waking hours.  However, if your leg feels better with compression at night, then you may continue compression at night as tolerated.       After the sclerotherapy procedure, 2 layers compression will be placed.       1. On the skin, folded or flat gauze will cover the treated areas.       2. A compression wrap (Comprilan) will be wrapped around your leg over the gauze. Once the compression wrap is in place, a compression stocking will be worn. This two layer  compression (wrap plus stocking) should be worn for the first 24 hours if tolerated.       3. After 24 hours, remove all compressions and dressings and just wear the compression stockings only during waking hours.        You will need to wear compression stockings for three weeks after your procedure, unless your physician instructs you otherwise.       Activity:       It is important NOT to be sitting or lying down for several hours after surgery. You may begin walking immediately after surgery. This is good for you, but take it easy.       For 2 days after treatment: Avoid aerobic exercise, weight training, and all other types of exertion that increase your breathing and pulse rates.       Do not get a tan for one month after sclerotherapy. Tanning increases your risk of skin discoloration.      Bathing:       After 24 hours, you may shower or bathe in tepid water, but keep the compression stocking on. Avoid immersion in hot tubs.      For pain or discomfort:       You may take acetaminophen (TylenolTM, Extra-Strength TylenolTM or DatrilTM) as directed.       Do not use aspirin, products containing aspirin, or ibuprofen (for example AdvilTM or MotrinTM) for five days after your surgery, unless approved by your physician.       Dietary restrictions: Do not drink alcoholic beverages for two days after your sclerotherapy procedure.       Possible side effects following sclerotherapy:  After sclerotherapy, mild swelling is expected. The injection sites may also become bruised or gray. You may also experience one or more of the following side effects, which almost always go away within one to four months:       Darkening of the injected veins.       Brownish staining of the skin.       Small clotted vessels under the surface of the skin that you can feel.       Bruising of the injection sites:       What to do about bruising - This will resolve within 2-3 weeks. If you wish the bruising to disappear sooner, then applying Arnicare cream (over the counter, health food stores) will help.       What to do if you feel small clotted vessels under the surface of the skin:       Call us for a follow up appointment. These small clots can be drained through a small nick.       Draining these small clots will help you heal faster and with less discoloration.       Contact your physician if you experience any of the following:       Treated areas become increasingly sore, tender, red or warm.        Acetaminophen does not relieve your discomfort.        Injection sites turn black or the skin around the site breaks down.        Ulceration of the injection sites.       You develop blisters from the tape.       You develop significant swelling or pain in the leg.       Darkening of large areas of the skin or foot.
Sclerosant (A) %: 1
Sclerosant Volume (Cc): 2
Render Post Care In Note: No
Consent was obtained with risks, benefits, and alternatives discussed for the above procedures.  Photographs were taken.
Disposition: Compression stockings and short stretch elastic bandages were placed postoperatively.

## 2024-10-14 ENCOUNTER — APPOINTMENT (RX ONLY)
Dept: URBAN - METROPOLITAN AREA CLINIC 141 | Facility: CLINIC | Age: 69
Setting detail: DERMATOLOGY
End: 2024-10-14

## 2024-10-14 DIAGNOSIS — I87.2 VENOUS INSUFFICIENCY (CHRONIC) (PERIPHERAL): ICD-10-CM

## 2024-10-14 PROCEDURE — ? FOLLOW UP ORDERS

## 2024-10-14 PROCEDURE — 99213 OFFICE O/P EST LOW 20 MIN: CPT

## 2024-10-14 PROCEDURE — ? LOWER EXTREMITY DOPPLER US

## 2024-10-14 PROCEDURE — 93970 EXTREMITY STUDY: CPT

## 2024-10-14 NOTE — PROCEDURE: FOLLOW UP ORDERS
Follow-Up (Free Text): She will follow up with an US in one year.
Follow-Up Preamble: Patient is s/p ablations of the bilateral GSV and UGS. Her legs feel great. No pain. Very happy. \\n\\nCurrent US shows no axial reflux.
Detail Level: Zone

## 2024-10-14 NOTE — HPI: VARICOSE VEINS (VARICOSITIES)
Is This A New Presentation, Or A Follow-Up?: Follow Up Varicosities
Additional History: Pt is here for long term US and FU, pt states her legs feel great and is very happy with the outcome.

## 2024-10-14 NOTE — PROCEDURE: LOWER EXTREMITY DOPPLER US
See Attached Documentation Text: Please refer to the attached ultrasound documentation for complete details of the procedure and the venous findings.
Treatment Number (Optional): Long Term F/U
Continue Conservative Therapy: Yes
Size In Mm: 2.5
Right Lower Extremity Comments: TRIBS MEDIAL/POSTERIOR CALF.
Size In Mm: 0
Intraluminal Thrombus: No
Reflux Options: Use Free Text
Size In Mm: 2.6
Detail Level: Simple
Size In Mm: 1.5
Size In Mm: 6.7
Right Tributary Reflux (In Seconds - Leave Blank If Not Applicable): 1.9
Right Tributary Size In Mm: 3.2
Right Intraluminal Thrombus- Yes: The right deep veins were imaged from the level of the common femoral vein to the posterior tibial veins. There was evidence of intraluminal thrombus as noted above.
Size In Mm: 5.5
Size In Mm: 2.3
Size In Mm: 2.1